# Patient Record
Sex: FEMALE
[De-identification: names, ages, dates, MRNs, and addresses within clinical notes are randomized per-mention and may not be internally consistent; named-entity substitution may affect disease eponyms.]

---

## 2024-09-04 ENCOUNTER — NON-APPOINTMENT (OUTPATIENT)
Age: 64
End: 2024-09-04

## 2024-09-04 PROBLEM — Z00.00 ENCOUNTER FOR PREVENTIVE HEALTH EXAMINATION: Status: ACTIVE | Noted: 2024-09-04

## 2024-09-11 ENCOUNTER — NON-APPOINTMENT (OUTPATIENT)
Age: 64
End: 2024-09-11

## 2024-09-12 NOTE — DATA REVIEWED
[FreeTextEntry1] : 1/10/24 (R) B/L sMMG: scattered areas of fbg density. Right upper outer focal asymmetry requires further evaluation. Left upper asymmetry requires further evaluation.  FOLLOW-UP: bilateral diagnostic mammogram with targeted US to further evaluate bilateral asymmetries.  BI-RADS 0:  8/8/24 (R) B/L DX MMG/US: scattered areas of fbg density.  MAMMO: - Right breast persistent right breast 11:00 focal asymmetry which corresponds with an 8mm mass at the right breast 11:00 on subsequent ultrasound. Ultrasound-guided core biopsy is recommended.  - Left breast persistent focal asymmetry in the posterior left UOQ without a correlate on subsequent ultrasound. Unless the patient submits outside mammograms demonstrating stability, six-month follow-up left mammography is recommended. US:  -Right breast 11:00 2cmfn, there is an 8mm indistinct mass mixed echogenicity. Ultrasound-guided core biopsy is recommended.  -Targeted images of the left breast are negative. BI-RADS 4  8/28/24 (R/[facility] Path) US-guided biopsy of 0.8 cm shadowing mass at the right 11:00 2cmfn (ribbon clip): breast tissue with dense stromal fibrosis and areas of sclerosis. Note is made that the anatomic pathology laboratory at [facility] experience to tissue processing protocol issue resulting in histologic artifact. They felt this does not compromise the diagnosis of the biopsy however that microscopic appearance was altered by this artifact. The biopsy clip is noted to be immediately adjacent to the focal asymmetry/area of architectural distortion seen mammographically. The pathology results are benign however are felt to be discordant with the imaging findings of architectural distortion. Stereotactic biopsy targeted to the architectural distortion is therefore recommended.  9/20/24 (R) R Stereotactic biopsy: PENDING

## 2024-09-12 NOTE — HISTORY OF PRESENT ILLNESS
[FreeTextEntry1] : 63 year old female was referred by  ____ who presents for initial evaluation regarding ___  Denies prior breast surgeries or biopsies.?? Patient denies palpable masses, nipple discharge, skin changes.?? Patient denies family history of breast cancer?? Denies famhx of ovarian cancer.?? Patient has not had genetic testing performed??  Michelle Lifetime Risk %??  Imaging/CD uploaded??

## 2024-09-12 NOTE — PHYSICAL EXAM
[Supple] : supple [No Supraclavicular Adenopathy] : no supraclavicular adenopathy [Examined in the supine and seated position] : examined in the supine and seated position [No dominant masses] : no dominant masses in right breast  [No dominant masses] : no dominant masses left breast [No Nipple Retraction] : no left nipple retraction [No Nipple Discharge] : no left nipple discharge

## 2024-09-13 ENCOUNTER — NON-APPOINTMENT (OUTPATIENT)
Age: 64
End: 2024-09-13

## 2024-09-18 ENCOUNTER — APPOINTMENT (OUTPATIENT)
Dept: BREAST CENTER | Facility: CLINIC | Age: 64
End: 2024-09-18